# Patient Record
Sex: FEMALE | Race: WHITE | NOT HISPANIC OR LATINO
[De-identification: names, ages, dates, MRNs, and addresses within clinical notes are randomized per-mention and may not be internally consistent; named-entity substitution may affect disease eponyms.]

---

## 2017-06-11 ENCOUNTER — RESULT REVIEW (OUTPATIENT)
Age: 37
End: 2017-06-11

## 2018-06-17 ENCOUNTER — RESULT REVIEW (OUTPATIENT)
Age: 38
End: 2018-06-17

## 2019-01-21 ENCOUNTER — APPOINTMENT (OUTPATIENT)
Dept: ORTHOPEDIC SURGERY | Facility: CLINIC | Age: 39
End: 2019-01-21
Payer: COMMERCIAL

## 2019-01-21 VITALS — BODY MASS INDEX: 36.19 KG/M2 | HEIGHT: 64 IN | WEIGHT: 212 LBS

## 2019-01-21 DIAGNOSIS — Z78.9 OTHER SPECIFIED HEALTH STATUS: ICD-10-CM

## 2019-01-21 DIAGNOSIS — M77.12 LATERAL EPICONDYLITIS, LEFT ELBOW: ICD-10-CM

## 2019-01-21 PROCEDURE — 99204 OFFICE O/P NEW MOD 45 MIN: CPT

## 2019-01-21 PROCEDURE — 73080 X-RAY EXAM OF ELBOW: CPT | Mod: LT

## 2019-01-21 RX ORDER — AMLODIPINE BESYLATE 5 MG/1
5 TABLET ORAL
Qty: 30 | Refills: 0 | Status: ACTIVE | COMMUNITY
Start: 2019-01-15

## 2019-01-21 NOTE — DISCUSSION/SUMMARY
[de-identified] : The underlying pathophysiology was reviewed with the patient. Treatment options were discussed including; observation, NSAIDs, analgesics, and injection. \par \par The patient wishes to proceed with a cortisone injection at this time. The skin was prepped with alcohol and sprayed with Ethyl Chloride. An injection of 0.5 cc 1% Lidocaine without epinephrine, 0.25 cc Kenalog 40mg, and 0.25 cc Dexamethasone was administered into the left lateral epicondyle. The patient tolerated the procedure well. Apply ice. Follow up as needed.

## 2019-01-21 NOTE — HISTORY OF PRESENT ILLNESS
[Worsening] : worsening [de-identified] : Patient is a 38 year old female who presents c/o left tennis elbow present since 11/21/18. She has tried treating the symptoms with a compression sleeve, ice and Tylenol. She states that the compression sleeve and ice provide relief, the Tylenol does not help. Patient states that she experiences weakness in the hand and is still dropping objects. She admits that the frequency of this has increased. Patient would like to try an injection.

## 2019-01-21 NOTE — PHYSICAL EXAM
[de-identified] : \par \par \par Patient is WDWN, alert, and in no acute distress. Breathing is unlabored. He/She is grossly oriented to person, place, and time. \par \par Left Elbow:\par   Inspection/Palpation: There is lateral epicondyle tenderness. No edema or deformities.\par   Range of Motion: Range of motion is full. Pain with resisted elbow and wrist extension. pain with resisted wrist extension.\par   Strength: Flexion and extension 5/5\par   Stability: No joint instability on provocative testing. No skin lesions or discoloration.  [de-identified] : AP, lateral and oblique views of the left elbow were obtained and revealed no abnormalities.

## 2019-03-25 ENCOUNTER — APPOINTMENT (OUTPATIENT)
Dept: ALLERGY | Facility: CLINIC | Age: 39
End: 2019-03-25
Payer: COMMERCIAL

## 2019-03-25 VITALS
BODY MASS INDEX: 35.17 KG/M2 | DIASTOLIC BLOOD PRESSURE: 94 MMHG | WEIGHT: 206 LBS | HEART RATE: 72 BPM | HEIGHT: 64 IN | SYSTOLIC BLOOD PRESSURE: 140 MMHG | RESPIRATION RATE: 16 BRPM

## 2019-03-25 PROCEDURE — 99214 OFFICE O/P EST MOD 30 MIN: CPT

## 2019-03-25 RX ORDER — FLUTICASONE PROPIONATE 50 MCG
SPRAY, SUSPENSION NASAL
Refills: 0 | Status: ACTIVE | COMMUNITY

## 2019-03-25 NOTE — ASSESSMENT
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

## 2019-03-25 NOTE — HISTORY OF PRESENT ILLNESS
[Asthma] : asthma [Allergic Rhinitis] : allergic rhinitis [Eczematous rashes] : eczematous rashes [Venom Reactions] : venom reactions [Food Allergies] : food allergies [de-identified] : Patient taking Claritin and sun avoidance - patient doing better but she will get a rash with sun exposed areas. - besides the rash she will get itchy - fatigued with this as well.   Patient with tinting on the windows in order to lessen her rash and she needs a note for this.

## 2019-03-25 NOTE — PHYSICAL EXAM
[Alert] : alert [Well Nourished] : well nourished [Healthy Appearance] : healthy appearance [No Acute Distress] : no acute distress [Well Developed] : well developed [Normal Voice/Communication] : normal voice communication [Normal Lips/Tongue] : the lips and tongue were normal [Normal Tonsils] : normal tonsils [No Oral Lesions or Ulcers] : no oral lesions or ulcers [No Neck Mass] : no neck mass was observed [No LAD] : no lymphadenopathy [No Thyroid Mass] : no thyroid mass [Supple] : the neck was supple [Normal Rate and Effort] : normal respiratory rhythm and effort [No Crackles] : no crackles [No Retractions] : no retractions [Normal Rate] : heart rate was normal  [Normal S1, S2] : normal S1 and S2 [No murmur] : no murmur [Regular Rhythm] : with a regular rhythm [Normal Cervical Lymph Nodes] : cervical [No Edema] : no edema [Normal Mood] : mood was normal [Normal Affect] : affect was normal [Judgment and Insight Age Appropriate] : judgement and insight is age appropriate [Alert, Awake, Oriented as Age-Appropriate] : alert, awake, oriented as age appropriate [Conjunctival Erythema] : no conjunctival erythema [Suborbital Bogginess] : no suborbital bogginess (allergic shiners) [Wheezing] : no wheezing was heard [Eczematous Patches] : no eczematous patches

## 2019-03-25 NOTE — HISTORY OF PRESENT ILLNESS
[Asthma] : asthma [Allergic Rhinitis] : allergic rhinitis [Eczematous rashes] : eczematous rashes [Venom Reactions] : venom reactions [Food Allergies] : food allergies [de-identified] : Patient taking Claritin and sun avoidance - patient doing better but she will get a rash with sun exposed areas. - besides the rash she will get itchy - fatigued with this as well.   Patient with tinting on the windows in order to lessen her rash and she needs a note for this.

## 2019-04-05 RX ORDER — PENICILLIN G SODIUM 5000000 [USP'U]/1
5000000 INJECTION, POWDER, FOR SOLUTION INTRAMUSCULAR; INTRAVENOUS
Qty: 1 | Refills: 0 | Status: ACTIVE | COMMUNITY
Start: 2019-04-05 | End: 1900-01-01

## 2019-04-08 ENCOUNTER — APPOINTMENT (OUTPATIENT)
Dept: ALLERGY | Facility: CLINIC | Age: 39
End: 2019-04-08
Payer: COMMERCIAL

## 2019-04-08 PROCEDURE — 95018 ALL TSTG PERQ&IQ DRUGS/BIOL: CPT

## 2019-04-08 RX ORDER — AMOXICILLIN 250 MG/5ML
250 POWDER, FOR SUSPENSION ORAL 3 TIMES DAILY
Qty: 1 | Refills: 0 | Status: ACTIVE | COMMUNITY
Start: 2019-04-08 | End: 1900-01-01

## 2019-04-08 NOTE — ASSESSMENT
[FreeTextEntry1] : Patient with negative skin testing to PrePen, Pen G, ampicillin and cefazolin.  She will RV for an oral challenge to Amoxicillin.

## 2019-04-15 ENCOUNTER — APPOINTMENT (OUTPATIENT)
Dept: ALLERGY | Facility: CLINIC | Age: 39
End: 2019-04-15
Payer: COMMERCIAL

## 2019-04-15 PROCEDURE — 95076 INGEST CHALLENGE INI 120 MIN: CPT

## 2019-04-15 NOTE — REASON FOR VISIT
[Routine Follow-Up] : a routine follow-up visit for [FreeTextEntry3] : patient being seen for an oral challenge to amoxicillin

## 2019-04-15 NOTE — ASSESSMENT
[FreeTextEntry1] : Patient tolerated her oral challenge to amoxicillin with no immediate allergic symptoms.   She/ will take an additional four doses of amoxicillin.   She has been advised that she/he may have a delayed allergic reaction and this has been reviewed with the patient.  If she develops any delayed allergic symptoms patient was advised to contact the office.   In addition, patient will contact the office on Monday for further instructions.\par

## 2019-04-15 NOTE — IMPRESSION
[FreeTextEntry1] : Patient tolerated her oral challenge to amoxicillin with no immediate allergic reaction.

## 2019-04-22 ENCOUNTER — OTHER (OUTPATIENT)
Age: 39
End: 2019-04-22

## 2020-04-25 ENCOUNTER — MESSAGE (OUTPATIENT)
Age: 40
End: 2020-04-25

## 2021-03-10 ENCOUNTER — RESULT REVIEW (OUTPATIENT)
Age: 41
End: 2021-03-10

## 2022-07-03 RX ORDER — AMLODIPINE BESYLATE 5 MG/1
TABLET ORAL
COMMUNITY

## 2022-07-03 RX ORDER — LABETALOL 200 MG/1
TABLET, FILM COATED ORAL
COMMUNITY

## 2022-07-03 RX ORDER — VALACYCLOVIR HYDROCHLORIDE 1 G/1
TABLET, FILM COATED ORAL
COMMUNITY